# Patient Record
(demographics unavailable — no encounter records)

---

## 2025-07-16 NOTE — PHYSICAL EXAM
[PERRLA] : SINDY [S1, S2 Present] : S1, S2 present [Clear to auscultation] : clear to auscultation [Not Examined] : not examined [No Abnormal Lymph Nodes Palpated] : no abnormal lymph nodes palpated [Muscle Strength] : normal muscle strength [Range Of Motion] : full range of motion [Acute distress] : no acute distress [Erythematous Conjunctiva] : nonerythematous conjunctiva [Erythematous Oropharynx] : nonerythematous oropharynx [Lesions] : no lesions [Murmurs] : no murmurs [Joint effusions] : no joint effusions [NumbJointsActiveArthritis] : 0 [NumbJointsLimitedMotion] : 0 [Thumbs bend back to reach forearm] : thumbs do not bend back to reach forearm [Hyperextension of elbows] : no hyperextension of elbows  [Hyperextension of knees] : no hyperextension of knees [Pronated flat feet] : no pronated flat feet

## 2025-07-16 NOTE — HISTORY OF PRESENT ILLNESS
[FreeTextEntry1] : Kelly is a 10 year old female with PMHx of ADHD presenting today for evaluation and follow up for uveitis.   Last year Kelly was complaining about not being able to see in the classroom or occasional blurry vision. She also complained of eye pain which was located behind both eyes in the temporal region. hurt more in the left eye than the right eye. 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  Denies any other symptoms or complaints. Denies joint stiffness, swelling, redness, warmth. Denies any oral ulcers, new rashes, frothy or bloody urine. Remainder of ROS are listed below.    PMHx: - bedwetting following 2x UTI's - ADHD FMHX: - no autoimmune family history Medications: - Viloxazine - Vitamins Allergies:  - Oral allergies to avocados, apples, cherries

## 2025-07-16 NOTE — REVIEW OF SYSTEMS
[Eye Pain] : eye pain [Chest Pain] : chest pain  or discomfort [Change in Appetite] : change in appetite [Decrease In Appetite] : decreased appetite [Constipation] : constipation [AM Stiffness] : am stiffness [Fever] : no fever [Wgt Loss (___ Lbs)] : no recent weight loss [Wgt Gain (___ Lbs)] : no recent [unfilled] weight gain [Rash] : no rash [Skin Lesions] : no skin lesions [Redness] : no redness [Blurry Vision] : no blurred vision [Sore Throat] : no sore throat [Oral Ulcers] : no oral ulcers [Wheezing] : no wheezing [Cough] : no cough [Shortness of Breath] : no shortness of breath [Vomiting] : no vomiting [Diarrhea] : no diarrhea [Abdominal Pain] : no abdominal pain [Limping] : no limping [Joint Pains] : no arthralgias [Joint Swelling] : no joint swelling [Back Pain] : ~T no back pain [Muscle Aches] : no muscle aches [Fainting] : no fainting [Seizure] : no seizures [Dizziness] : no dizziness [Sleep Disturbances] : ~T no sleep disturbances [Bruising] : no tendency for easy bruising

## 2025-07-16 NOTE — END OF VISIT
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 These medications were ordered Discussion re monitoring and side effects with mom and Kelly  [Time Spent: ___ minutes] : I have spent [unfilled] minutes of time on the encounter which excludes teaching and separately reported services.

## 2025-07-16 NOTE — DISCUSSION/SUMMARY
[FreeTextEntry1] : Kelly is a 10-year-old female with PMHx of ADHD who is presenting for evaluation of uveitis, which in the absence of other systemic symptoms/arthritis, is likely idiopathic. Mother and Kelly counseled on methotrexate and IM Humeira use as well as side effects.

## 2025-07-16 NOTE — REASON FOR VISIT
[Consultation: ________] : [unfilled] is a new patient being seen for a [unfilled] consultation visit [Mother] : mother [Patient] : patient